# Patient Record
Sex: FEMALE | Race: WHITE | Employment: OTHER | URBAN - METROPOLITAN AREA
[De-identification: names, ages, dates, MRNs, and addresses within clinical notes are randomized per-mention and may not be internally consistent; named-entity substitution may affect disease eponyms.]

---

## 2023-10-13 PROBLEM — K21.9 GASTROESOPHAGEAL REFLUX DISEASE: Status: ACTIVE | Noted: 2023-10-13

## 2023-10-13 PROBLEM — E11.9 DIABETES MELLITUS (MULTI): Status: ACTIVE | Noted: 2023-10-13

## 2023-10-13 PROBLEM — I10 BENIGN HYPERTENSION: Status: ACTIVE | Noted: 2023-10-13

## 2023-10-13 PROBLEM — E78.00 HYPERCHOLESTEROLEMIA: Status: ACTIVE | Noted: 2023-10-13

## 2023-10-13 PROBLEM — E03.9 HYPOTHYROIDISM: Status: ACTIVE | Noted: 2023-10-13

## 2023-10-13 RX ORDER — OMEPRAZOLE 20 MG/1
20 CAPSULE, DELAYED RELEASE ORAL EVERY 24 HOURS
COMMUNITY
Start: 2014-05-13

## 2023-10-13 RX ORDER — CLINDAMYCIN HYDROCHLORIDE 300 MG/1
300 CAPSULE ORAL
COMMUNITY
Start: 2023-07-10

## 2023-10-13 RX ORDER — LEVOTHYROXINE SODIUM 75 UG/1
75 TABLET ORAL
COMMUNITY
Start: 2023-06-23 | End: 2024-03-31

## 2023-10-13 RX ORDER — AMLODIPINE BESYLATE 10 MG/1
10 TABLET ORAL EVERY 24 HOURS
COMMUNITY
Start: 2014-05-13

## 2023-10-13 RX ORDER — BENAZEPRIL HYDROCHLORIDE 20 MG/1
20 TABLET ORAL EVERY 24 HOURS
COMMUNITY
Start: 2014-05-13

## 2023-10-13 RX ORDER — SIMVASTATIN 40 MG/1
40 TABLET, FILM COATED ORAL EVERY 24 HOURS
COMMUNITY
Start: 2014-05-13

## 2023-10-13 RX ORDER — METFORMIN HYDROCHLORIDE 500 MG/1
500 TABLET ORAL EVERY 24 HOURS
COMMUNITY

## 2023-10-13 RX ORDER — ZOLPIDEM TARTRATE 5 MG/1
5 TABLET ORAL DAILY PRN
COMMUNITY
Start: 2023-10-03 | End: 2024-01-01

## 2023-10-13 RX ORDER — NAPROXEN SODIUM 220 MG/1
81 TABLET, FILM COATED ORAL EVERY 24 HOURS
COMMUNITY

## 2023-10-16 ENCOUNTER — CLINICAL SUPPORT (OUTPATIENT)
Dept: AUDIOLOGY | Facility: CLINIC | Age: 83
End: 2023-10-16
Payer: MEDICARE

## 2023-10-16 DIAGNOSIS — H69.93 DYSFUNCTION OF BOTH EUSTACHIAN TUBES: Primary | ICD-10-CM

## 2023-10-16 DIAGNOSIS — H90.3 SENSORINEURAL HEARING LOSS (SNHL) OF BOTH EARS: ICD-10-CM

## 2023-10-16 PROCEDURE — HRANC PR HEARING AID NO CHARGE: Performed by: AUDIOLOGIST

## 2023-10-16 NOTE — PROGRESS NOTES
HEARING AID CHECK    RIGHT: T R U HEARING PREMIUM 5 KAY LI (119/60) SN: [id number] TODAY LARGE OPEN DOME DUE TO FEELING HEARING ECHO  LEFT: T R U HEARING PREMIUM 5 KAY LI (119/60) SN: [id number] SMALL 6 MM CLOSED  FIT DATE: 7/15/21  WARRANTY: 7/15/24  TODAY: 10/16/23  LAST TIME:  PATIENT SEEN FOR A HACHECK AS THRESHOLDS WORSENED AU AND SHOULD SEE DR. JAY . PUT NEW THRESHOLDS IN [facility] AND RE PROGRAMMED   RAISED MASTER GAIN TO 16 AU AND SEEMS HAPPY WITH THESE SETTINGS. WILL RESCHEDULE IN 6 MONTHS TO ONE YEAR AS NEEDED.  TODAY:  CHANGED RIGHT DOME TO LARGE OPEN AND KEPT LEFT THE SAME .  REMINDED HER OF VC USE AND SHE DID NOT WANT PROGRAMMING CHANGES.    Otoscopy : EARDRUMS APPEARED ABNORMAL  HAS HAD PERFS.  SEES DR. JAY FOR REGULAR CLEANINGS .    The following programming changes were made:    The patient paid  for today's visit.  Return in 6 months or sooner if needed.    APPOINTMENT TIME: 9:00- 9 :35

## 2024-03-27 ENCOUNTER — HOSPITAL ENCOUNTER (OUTPATIENT)
Dept: RADIOLOGY | Facility: CLINIC | Age: 84
Discharge: HOME | End: 2024-03-27
Payer: MEDICARE

## 2024-03-27 VITALS — BODY MASS INDEX: 32.2 KG/M2 | HEIGHT: 60 IN | WEIGHT: 164 LBS

## 2024-03-27 DIAGNOSIS — Z12.31 ENCOUNTER FOR SCREENING MAMMOGRAM FOR MALIGNANT NEOPLASM OF BREAST: ICD-10-CM

## 2024-03-27 PROCEDURE — 77067 SCR MAMMO BI INCL CAD: CPT | Performed by: RADIOLOGY

## 2024-03-27 PROCEDURE — 77063 BREAST TOMOSYNTHESIS BI: CPT | Performed by: RADIOLOGY

## 2024-03-27 PROCEDURE — 77067 SCR MAMMO BI INCL CAD: CPT

## 2024-04-15 ENCOUNTER — APPOINTMENT (OUTPATIENT)
Dept: AUDIOLOGY | Facility: HOSPITAL | Age: 84
End: 2024-04-15
Payer: MEDICARE

## 2024-04-15 ENCOUNTER — CLINICAL SUPPORT (OUTPATIENT)
Dept: AUDIOLOGY | Facility: CLINIC | Age: 84
End: 2024-04-15

## 2024-04-15 DIAGNOSIS — H90.72 MIXED CONDUCTIVE AND SENSORINEURAL HEARING LOSS OF LEFT EAR WITH UNRESTRICTED HEARING OF RIGHT EAR: ICD-10-CM

## 2024-04-15 DIAGNOSIS — H90.71 MIXED CONDUCTIVE AND SENSORINEURAL HEARING LOSS OF RIGHT EAR WITH UNRESTRICTED HEARING OF LEFT EAR: Primary | ICD-10-CM

## 2024-04-15 PROCEDURE — HRANC PR HEARING AID NO CHARGE: Performed by: AUDIOLOGIST

## 2024-04-15 NOTE — PROGRESS NOTES
HEARING AID CHECK    RIGHT:  RIGHT: T R U HEARING PREMIUM 5 KAY LI (119/60) SN: [id number] TODAY 6 mm closed DOME. LEFT: T R U HEARING PREMIUM 5 KAY LI (119/60) SN: [id number] SMALL 6 MM CLOSED  FIT DATE: 7/15/21  WARRANTY: 7/15/24  TODAY: 4/15/24  :  PATIENT SEEN FOR A HACHECK AS THRESHOLDS WORSENED AU AND SHOULD SEE DR. JAY . PUT NEW THRESHOLDS IN [facility] AND RE PROGRAMMED   RAISED MASTER GAIN TO 24 AU AND SEEMS HAPPY WITH THESE SETTINGS. WILL SCHEDULE  for a hearing test and hae.TODAY:  CHANGED DOME s TO LARGE OPEN AND KEPT LEFT THE SAME .  REMINDED HER OF VC USE AND SHE DID NOT WANT PROGRAMMING CHANGES.     Otoscopy : EARDRUMS APPEARED ABNORMAL  HAS HAD PERFS.  SEES DR. JAY FOR REGULAR CLEANINGS .       The following programming changes were made: See above.    The patient paid  for today's visit.  Return in 6 months or sooner if needed.    APPOINTMENT TIME: 40 minutes

## 2024-06-03 ENCOUNTER — CLINICAL SUPPORT (OUTPATIENT)
Dept: AUDIOLOGY | Facility: CLINIC | Age: 84
End: 2024-06-03
Payer: MEDICARE

## 2024-06-03 DIAGNOSIS — H90.3 ASYMMETRICAL SENSORINEURAL HEARING LOSS: ICD-10-CM

## 2024-06-03 DIAGNOSIS — H90.3 SENSORINEURAL HEARING LOSS (SNHL) OF BOTH EARS: Primary | ICD-10-CM

## 2024-06-03 PROCEDURE — 92557 COMPREHENSIVE HEARING TEST: CPT | Performed by: AUDIOLOGIST

## 2024-06-03 PROCEDURE — HRANC PR HEARING AID NO CHARGE: Performed by: AUDIOLOGIST

## 2024-06-03 PROCEDURE — 92550 TYMPANOMETRY & REFLEX THRESH: CPT | Performed by: AUDIOLOGIST

## 2024-06-03 NOTE — PROGRESS NOTES
AUDIOLOGY ADULT AUDIOMETRIC EVALUATION    Name:  Martina Carpio  :  1940  Age:  83 y.o.  Date of Evaluation:  Yi 3, 2024    Reason for visit: Ms. Carpio is seen in the clinic today at the request of otolaryngology for an audiologic evaluation.     HISTORY  Patient complains of hearing loss worsening and wants new aids through her [facility] benefit.    EVALUATION  See scanned audiogram: “Media” > “Audiology Report”.  No images are attached to the encounter or orders placed in the encounter.    RESULTS  Otoscopic Evaluation:  Right Ear: clear ear canal  Left Ear: clear ear canal/ surgical ear.    Immittance Measures:  Tympanometry:  Right Ear: Type A, normal tympanic membrane mobility with normal middle ear pressure   Left Ear: Type B, reduced tympanic membrane mobility     Acoustic Reflexes:  Ipsilateral Right Ear: NR NR NR NR  Ipsilateral Left Ear:   NR NR NR NR  Contralateral Right Ear: did not evaluate  Contralateral Left Ear: did not evaluate      Audiometry:  Test Technique and Reliability:  BEHAVIORAL   Standard audiometry via supra-aural headphones. Reliability is good.    Pure tone air and bone conduction audiometry:  Right Ear: MODERATE TO SEVERE WORSENED SNHL.  Left Ear:   MODERATE TO SEVERE TO PROFOUND SNHL.    Speech Audiometry (Word Recognition Scores):   Right Ear:  80% GOOD  Left Ear:    76% FAIR    IMPRESSIONS  ASYMMETRIC LONG TERM SNHL WORSENED FROM LAST AUDIOGRAM.  The presence of acoustic reflexes within normal intensity limits is consistent with normal middle ear and brainstem function, and suggests that auditory sensitivity is not significantly impaired. An elevated or absent acoustic reflex threshold is consistent with a middle ear disorder, hearing loss in the stimulated ear, and/or interruption of neural innervation of the stapedius muscle. Present DPOAEs suggest normal/near normal cochlear outer hair cell function and are consistent with no greater than a mild hearing loss at  those frequencies. Absent DPOAEs are consistent with abnormal cochlear outer hair cell function and some degree of hearing loss at those frequencies.    RECOMMENDATIONS  - Follow up with otolaryngology FOR WORSENING THRESHOLDS SINCE LAST AUDIOGRAM /DISCUSSED.  - Audiologic evaluation as needed.  - Annual audiologic evaluation, sooner if an acute change is noted.  - Audiologic evaluation in conjunction with otologic care, if an acute change is noted, and/or annually.  - Consider hearing aids. [facility] CONSULT TODAY. - Continued hearing aid use.  - Follow-up with audiology annually for routine hearing aid maintenance, sooner if questions/problems arise.  - Follow-up with medical care team as planned.    PATIENT EDUCATION  Discussed results, impressions and recommendations with the patient. Questions were addressed and the patient was encouraged to contact our office should concerns arise.    Time for this encounter: 30 MINUTES     Lamont Blair  Licensed Audiologist   Alert-The patient is alert, awake and responds to voice. The patient is oriented to time, place, and person. The triage nurse is able to obtain subjective information.

## 2024-06-03 NOTE — PROGRESS NOTES
Hearing Aid Evaluation  Time of Appointment:  Chief Complaint   Patient presents with    Hearing Loss     [facility] CONSULT  [facility]       This patient was seen for an HAE. The patient has a benefit through [facility] .    PATIENT WEARS TH [facility] AND CHOSE .00 [employer] TH 7 RECHARGEABLE AID  WITH #1 LENGTH AND CLOSED SMALL DOMES/ SMALL POWER DOMES.    Order placed today: [facility] TH 7 LI ION .    The patient paid THROUGH THE PORTAL FOR HER AIDS TODA AT $2098.00  for today's visit.   EACH .    Return for hearing aid fitting that was scheduled today.      Time of Appointment: 35 MINUTES.

## 2024-07-01 ENCOUNTER — APPOINTMENT (OUTPATIENT)
Dept: AUDIOLOGY | Facility: CLINIC | Age: 84
End: 2024-07-01
Payer: MEDICARE

## 2024-07-01 DIAGNOSIS — H90.3 SENSORINEURAL HEARING LOSS (SNHL) OF BOTH EARS: Primary | ICD-10-CM

## 2024-07-01 PROCEDURE — HRANC PR HEARING AID NO CHARGE: Performed by: AUDIOLOGIST

## 2024-07-01 NOTE — PROGRESS NOTES
HEARING AID FITTING    TIME OF APPOINTMENT    RIGHT: [facility] 7 LI -ION [id number]  LEFT:    [facility] 7 LI -ION [id number]  FIT DATE:7/1/24  WARRANTY: 7/1/2027    This patient was seen for a H A F ITTING .  SET AT [employer] AND PATIENT WHO IS AN EXPERIENCED USER BUT NEEDED GAIN DOWN  3-4 AU.  NO FB NOTED AND DID NOT RUN FB. REVIEWED ALL AROUND  SETTING WITH NO VC YET. PATIENT WILL TELL ME AT 2 WEEK CHECK WHAT SHE LIKES.     Set the devices to [employer] AND DOWN 3.    Signed the Purchase Agreement and HIPAA forms.  The patient manipulated the devices well.  They were pleased with the sound and fit.    The patient paid their balance in full THROUGH THE PORTAL.  Return in 2 weeks to review changing [employer], or sooner if needed.

## 2024-07-15 ENCOUNTER — APPOINTMENT (OUTPATIENT)
Dept: AUDIOLOGY | Facility: CLINIC | Age: 84
End: 2024-07-15
Payer: MEDICARE

## 2024-07-15 DIAGNOSIS — H90.3 ASYMMETRICAL SENSORINEURAL HEARING LOSS: ICD-10-CM

## 2024-07-15 DIAGNOSIS — H90.3 SENSORINEURAL HEARING LOSS (SNHL) OF BOTH EARS: Primary | ICD-10-CM

## 2024-07-15 PROCEDURE — HRANC PR HEARING AID NO CHARGE: Performed by: AUDIOLOGIST

## 2024-07-15 NOTE — PROGRESS NOTES
Name: Martina Carpio  YOB: 1940  Age: 83 y.o.    HEARING AID CHECK    RIGHT: [facility] 7 LI -ION [id number] #1 length M  and med closed.  LEFT:    [facility] 7 LI -ION [id number] #1 length M  and med closed dome.  FIT DATE:7/1/24  WARRANTY: 7/1/2027    This patient was seen for a HAC with the complaint that  aids cause  a bit  of soreness at top of ears.  A #2 would be a bit too long and she is afraid they will fall out.   I changed to closed medium domes instead of small for better retention and raised gain 2 but too loud so raised gain 1 and demonstrated the VC again. Using an outside TV speaker for TV and likes it very much. Reminded her of the 60 day trial period as she may want to return  Real ear scheduled today will be done at next visit if she keeps aids.      Otoscopy : Debris and wax in both . Will make an appointment for cleaning.    The following programming changes were made: Cleaned and checked and changed to medium closed domes and raised gain one overall and checked 12 hour wear  time.    The patient paid  for today's visit.  Return in 6 months or sooner if needed.    APPOINTMENT TIME: 30     Lamont Blair  Audiologist    Time:

## 2024-08-19 ENCOUNTER — APPOINTMENT (OUTPATIENT)
Dept: AUDIOLOGY | Facility: CLINIC | Age: 84
End: 2024-08-19
Payer: COMMERCIAL

## 2024-08-19 DIAGNOSIS — H90.3 ASYMMETRICAL SENSORINEURAL HEARING LOSS: ICD-10-CM

## 2024-08-19 DIAGNOSIS — H90.3 SENSORINEURAL HEARING LOSS (SNHL) OF BOTH EARS: Primary | ICD-10-CM

## 2024-08-19 NOTE — PROGRESS NOTES
HEARING AID CHECK    RIGHT:   HEARING AID CHECK     RIGHT: [facility] 7 LI -ION [id number] #1 length M  and med closed.  LEFT:    [facility] 7 LI -ION [id number] #1 length M  and med closed dome.  FIT DATE:7/1/24  WARRANTY: 7/1/2027   TODAY: 8/19/24    This patient was seen for a HAC with NO complaints  today   Performed real ear with favorable outcomes and will see in 6 months.  She does have an appointment to see Dr. Solorio for ear cleaning.         Otoscopy:  wax au     The following programming changes were made: Raised gain overall one and did Real Ear.    The patient paid No Charge  for today's visit.  Return in 6 months or sooner if needed.    APPOINTMENT TIME:

## 2024-10-21 ENCOUNTER — APPOINTMENT (OUTPATIENT)
Dept: OTOLARYNGOLOGY | Facility: CLINIC | Age: 84
End: 2024-10-21
Payer: COMMERCIAL

## 2024-10-21 VITALS — TEMPERATURE: 96.8 F | HEIGHT: 60 IN | WEIGHT: 168 LBS | BODY MASS INDEX: 32.98 KG/M2

## 2024-10-21 DIAGNOSIS — H90.3 BILATERAL SENSORINEURAL HEARING LOSS: Primary | ICD-10-CM

## 2024-10-21 DIAGNOSIS — H61.20 CERUMEN IN AUDITORY CANAL ON EXAMINATION: ICD-10-CM

## 2024-10-21 PROCEDURE — 1159F MED LIST DOCD IN RCRD: CPT | Performed by: OTOLARYNGOLOGY

## 2024-10-21 PROCEDURE — 1036F TOBACCO NON-USER: CPT | Performed by: OTOLARYNGOLOGY

## 2024-10-21 PROCEDURE — 99213 OFFICE O/P EST LOW 20 MIN: CPT | Performed by: OTOLARYNGOLOGY

## 2024-10-21 PROCEDURE — 1160F RVW MEDS BY RX/DR IN RCRD: CPT | Performed by: OTOLARYNGOLOGY

## 2024-10-21 RX ORDER — ALLOPURINOL 100 MG/1
100 TABLET ORAL DAILY
COMMUNITY

## 2024-10-21 NOTE — PROGRESS NOTES
HPI  Martina Carpio is a 84 y.o. female last seen by me in May 2023.  History of bilateral hearing loss, bilateral hearing aids. Left mixed loss status post surgery years ago in [place]. She requires cleaning periodically and is prone to cerumen impaction on the left.  Kindly referred by Shahrzad.   She is seeing Shahrzad on a regular basis. Denies otalgia and otorrhea.       History reviewed. No pertinent past medical history.         Medications:     Current Outpatient Medications:     allopurinol (Zyloprim) 100 mg tablet, Take 1 tablet (100 mg) by mouth once daily., Disp: , Rfl:     amLODIPine (Norvasc) 10 mg tablet, 1 tablet (10 mg) once every 24 hours., Disp: , Rfl:     aspirin 81 mg chewable tablet, Chew 1 tablet (81 mg) once every 24 hours., Disp: , Rfl:     benazepril (Lotensin) 20 mg tablet, Take 1 tablet (20 mg) by mouth once every 24 hours., Disp: , Rfl:     simvastatin (Zocor) 40 mg tablet, Take 1 tablet (40 mg) by mouth once every 24 hours., Disp: , Rfl:     clindamycin (Cleocin) 300 mg capsule, Take 1 capsule (300 mg) by mouth. (Patient not taking: Reported on 10/21/2024), Disp: , Rfl:     levothyroxine (Synthroid, Levoxyl) 75 mcg tablet, Take 1 tablet (75 mcg) by mouth once daily., Disp: , Rfl:     metFORMIN (Glucophage) 500 mg tablet, Take 1 tablet (500 mg) by mouth once every 24 hours. (Patient not taking: Reported on 10/21/2024), Disp: , Rfl:     omeprazole (PriLOSEC) 20 mg DR capsule, Take 1 capsule (20 mg) by mouth once every 24 hours., Disp: , Rfl:     zolpidem (Ambien) 5 mg tablet, Take 1 tablet (5 mg) by mouth once daily as needed., Disp: , Rfl:      Allergies:  Allergies   Allergen Reactions    Amoxicillin-Pot Clavulanate Unknown    Cephalosporins Unknown    Nitrofurantoin Macrocrystal Unknown    Penicillin Unknown    Tramadol Hcl Unknown        Physical Exam:  Last Recorded Vitals  Temperature 36 °C (96.8 °F), height 1.524 m (5'), weight 76.2 kg (168 lb).  General:     General appearance:  Well-developed, well-nourished in no acute distress.       Voice:  normal       Head/face: Normal appearance; nontender to palpation     Facial nerve function: Normal and symmetric bilaterally.    Oral/oropharynx:     Oral vestibule: Normal labial and gingival mucosa     Tongue/floor of mouth: Normal without lesion     Oropharynx: Clear.  No lesions present of the hard/soft palate, posterior pharynx    Neck:     Neck: Normal appearance, trachea midline     Salivary glands: Normal to palpation bilaterally     Lymph nodes: No cervical lymphadenopathy to palpation     Thyroid: No thyromegaly.  No palpable nodules     Range of motion: Normal    Neurological:     Cortical functions: Alert and oriented x3, appropriate affect       Larynx/hypopharynx:     Laryngeal findings: Mirror exam inadequate or limited secondary to enlarged base of tongue and/or excessive gagging    Ear:     Ear canal: Normal bilaterally after cleaning cerumen bilaterally with wire-loop and microscope     Tympanic membrane: Intact and mobile bilaterally     Pinna: Normal bilaterally     Hearing:  Gross hearing assessment normal by voice    Nose:     Visualized using: Anterior rhinoscopy     Nasopharynx: Inadequate mirror exam secondary to gag, anatomy.       Nasal dorsum: Nontraumatic midline appearance     Septum: Midline     Inferior turbinates: Normally sized     Mucosa: Bilateral, pink, normal appearing       ASSESSMENT/PLAN:  Ears cleaned bilaterally.  Continue hearing aids.  Recheck periodically for cleaning as needed        Augustin Solorio MD

## 2025-02-17 ENCOUNTER — APPOINTMENT (OUTPATIENT)
Dept: AUDIOLOGY | Facility: CLINIC | Age: 85
End: 2025-02-17
Payer: COMMERCIAL

## 2025-03-10 ENCOUNTER — APPOINTMENT (OUTPATIENT)
Dept: AUDIOLOGY | Facility: CLINIC | Age: 85
End: 2025-03-10

## 2025-03-10 NOTE — PROGRESS NOTES
HEARING AID CHECK    RIGHT:[facility] 7 LI -ION [id number] #1 length M  and med closed.  LEFT:    [facility] 7 LI -ION [id number] #1 length M  and med closed dome.  FIT DATE:7/1/24  WARRANTY: 7/1/2027   TODAY: 3/10/25       This patient was seen for a HAC with NO complaints and that she is wearing aids about 10 hours per day and not adjusting aids much. She is satisfied with programming . Patient has [facility] and is still able to come here until 2027 and know we are not a [facility] dealer.  Today's visit is no charge as still within one year and she knows subsequent visits will have a charge of $65.00.     Otoscopy : Clear with very slight cerumen left and sees Dr. Solorio once per year for cleaning.     The following programming changes were made: None today as patient is happy with programming. Clean and check and vacuum with new filters and domes and supplies given.     The patient paid   No Charge as within one year for today's visit.  Return in 6 months or sooner if needed.    APPOINTMENT TIME:  30 minutes

## 2025-09-10 ENCOUNTER — APPOINTMENT (OUTPATIENT)
Dept: AUDIOLOGY | Facility: CLINIC | Age: 85
End: 2025-09-10
Payer: COMMERCIAL